# Patient Record
Sex: FEMALE | Race: WHITE | NOT HISPANIC OR LATINO | URBAN - METROPOLITAN AREA
[De-identification: names, ages, dates, MRNs, and addresses within clinical notes are randomized per-mention and may not be internally consistent; named-entity substitution may affect disease eponyms.]

---

## 2022-04-13 ENCOUNTER — EMERGENCY (EMERGENCY)
Facility: HOSPITAL | Age: 47
LOS: 1 days | Discharge: HOME | End: 2022-04-13
Attending: STUDENT IN AN ORGANIZED HEALTH CARE EDUCATION/TRAINING PROGRAM | Admitting: STUDENT IN AN ORGANIZED HEALTH CARE EDUCATION/TRAINING PROGRAM
Payer: COMMERCIAL

## 2022-04-13 VITALS
HEART RATE: 78 BPM | TEMPERATURE: 97 F | SYSTOLIC BLOOD PRESSURE: 122 MMHG | RESPIRATION RATE: 18 BRPM | OXYGEN SATURATION: 100 % | DIASTOLIC BLOOD PRESSURE: 79 MMHG

## 2022-04-13 VITALS
WEIGHT: 169.98 LBS | HEART RATE: 85 BPM | OXYGEN SATURATION: 97 % | SYSTOLIC BLOOD PRESSURE: 139 MMHG | TEMPERATURE: 98 F | DIASTOLIC BLOOD PRESSURE: 100 MMHG | RESPIRATION RATE: 18 BRPM | HEIGHT: 64 IN

## 2022-04-13 DIAGNOSIS — D72.829 ELEVATED WHITE BLOOD CELL COUNT, UNSPECIFIED: ICD-10-CM

## 2022-04-13 DIAGNOSIS — E86.0 DEHYDRATION: ICD-10-CM

## 2022-04-13 DIAGNOSIS — Z20.822 CONTACT WITH AND (SUSPECTED) EXPOSURE TO COVID-19: ICD-10-CM

## 2022-04-13 DIAGNOSIS — Z88.1 ALLERGY STATUS TO OTHER ANTIBIOTIC AGENTS STATUS: ICD-10-CM

## 2022-04-13 DIAGNOSIS — R10.9 UNSPECIFIED ABDOMINAL PAIN: ICD-10-CM

## 2022-04-13 DIAGNOSIS — Z88.0 ALLERGY STATUS TO PENICILLIN: ICD-10-CM

## 2022-04-13 DIAGNOSIS — R53.1 WEAKNESS: ICD-10-CM

## 2022-04-13 DIAGNOSIS — R11.2 NAUSEA WITH VOMITING, UNSPECIFIED: ICD-10-CM

## 2022-04-13 LAB
ALBUMIN SERPL ELPH-MCNC: 4.3 G/DL — SIGNIFICANT CHANGE UP (ref 3.3–5)
ALBUMIN SERPL ELPH-MCNC: 5.7 G/DL — HIGH (ref 3.3–5)
ALP SERPL-CCNC: 47 U/L — SIGNIFICANT CHANGE UP (ref 40–120)
ALP SERPL-CCNC: 70 U/L — SIGNIFICANT CHANGE UP (ref 40–120)
ALT FLD-CCNC: 35 U/L — SIGNIFICANT CHANGE UP (ref 10–45)
ALT FLD-CCNC: 51 U/L — HIGH (ref 10–45)
ANION GAP SERPL CALC-SCNC: 16 MMOL/L — SIGNIFICANT CHANGE UP (ref 5–17)
ANION GAP SERPL CALC-SCNC: 27 MMOL/L — HIGH (ref 5–17)
APPEARANCE UR: CLEAR — SIGNIFICANT CHANGE UP
AST SERPL-CCNC: 23 U/L — SIGNIFICANT CHANGE UP (ref 10–40)
AST SERPL-CCNC: 34 U/L — SIGNIFICANT CHANGE UP (ref 10–40)
BACTERIA # UR AUTO: PRESENT /HPF
BASE EXCESS BLDV CALC-SCNC: -15.6 MMOL/L — LOW (ref -2–3)
BASOPHILS # BLD AUTO: 0.06 K/UL — SIGNIFICANT CHANGE UP (ref 0–0.2)
BASOPHILS NFR BLD AUTO: 0.3 % — SIGNIFICANT CHANGE UP (ref 0–2)
BILIRUB SERPL-MCNC: 0.4 MG/DL — SIGNIFICANT CHANGE UP (ref 0.2–1.2)
BILIRUB SERPL-MCNC: 0.5 MG/DL — SIGNIFICANT CHANGE UP (ref 0.2–1.2)
BILIRUB UR-MCNC: ABNORMAL
BUN SERPL-MCNC: 7 MG/DL — SIGNIFICANT CHANGE UP (ref 7–23)
BUN SERPL-MCNC: 9 MG/DL — SIGNIFICANT CHANGE UP (ref 7–23)
CA-I SERPL-SCNC: 1.29 MMOL/L — SIGNIFICANT CHANGE UP (ref 1.15–1.33)
CALCIUM SERPL-MCNC: 10.9 MG/DL — HIGH (ref 8.4–10.5)
CALCIUM SERPL-MCNC: 8.6 MG/DL — SIGNIFICANT CHANGE UP (ref 8.4–10.5)
CHLORIDE SERPL-SCNC: 100 MMOL/L — SIGNIFICANT CHANGE UP (ref 96–108)
CHLORIDE SERPL-SCNC: 108 MMOL/L — SIGNIFICANT CHANGE UP (ref 96–108)
CO2 BLDV-SCNC: 10.8 MMOL/L — LOW (ref 22–26)
CO2 SERPL-SCNC: 11 MMOL/L — LOW (ref 22–31)
CO2 SERPL-SCNC: 9 MMOL/L — CRITICAL LOW (ref 22–31)
COLOR SPEC: YELLOW — SIGNIFICANT CHANGE UP
CREAT SERPL-MCNC: 0.67 MG/DL — SIGNIFICANT CHANGE UP (ref 0.5–1.3)
CREAT SERPL-MCNC: 0.87 MG/DL — SIGNIFICANT CHANGE UP (ref 0.5–1.3)
DIFF PNL FLD: ABNORMAL
EGFR: 110 ML/MIN/1.73M2 — SIGNIFICANT CHANGE UP
EGFR: 84 ML/MIN/1.73M2 — SIGNIFICANT CHANGE UP
EOSINOPHIL # BLD AUTO: 0.01 K/UL — SIGNIFICANT CHANGE UP (ref 0–0.5)
EOSINOPHIL NFR BLD AUTO: 0 % — SIGNIFICANT CHANGE UP (ref 0–6)
EPI CELLS # UR: SIGNIFICANT CHANGE UP /HPF (ref 0–5)
GAS PNL BLDV: 131 MMOL/L — LOW (ref 136–145)
GAS PNL BLDV: SIGNIFICANT CHANGE UP
GLUCOSE SERPL-MCNC: 177 MG/DL — HIGH (ref 70–99)
GLUCOSE SERPL-MCNC: 381 MG/DL — HIGH (ref 70–99)
GLUCOSE UR QL: NEGATIVE — SIGNIFICANT CHANGE UP
HCO3 BLDV-SCNC: 10 MMOL/L — CRITICAL LOW (ref 22–29)
HCT VFR BLD CALC: 39 % — SIGNIFICANT CHANGE UP (ref 34.5–45)
HCT VFR BLD CALC: 48.7 % — HIGH (ref 34.5–45)
HGB BLD-MCNC: 13.4 G/DL — SIGNIFICANT CHANGE UP (ref 11.5–15.5)
HGB BLD-MCNC: 16.7 G/DL — HIGH (ref 11.5–15.5)
IMM GRANULOCYTES NFR BLD AUTO: 0.5 % — SIGNIFICANT CHANGE UP (ref 0–1.5)
KETONES UR-MCNC: >=80 MG/DL
LACTATE SERPL-SCNC: 1.9 MMOL/L — SIGNIFICANT CHANGE UP (ref 0.5–2)
LEUKOCYTE ESTERASE UR-ACNC: NEGATIVE — SIGNIFICANT CHANGE UP
LIDOCAIN IGE QN: 27 U/L — SIGNIFICANT CHANGE UP (ref 7–60)
LYMPHOCYTES # BLD AUTO: 1.14 K/UL — SIGNIFICANT CHANGE UP (ref 1–3.3)
LYMPHOCYTES # BLD AUTO: 5.2 % — LOW (ref 13–44)
MCHC RBC-ENTMCNC: 30.8 PG — SIGNIFICANT CHANGE UP (ref 27–34)
MCHC RBC-ENTMCNC: 31.5 PG — SIGNIFICANT CHANGE UP (ref 27–34)
MCHC RBC-ENTMCNC: 34.3 GM/DL — SIGNIFICANT CHANGE UP (ref 32–36)
MCHC RBC-ENTMCNC: 34.4 GM/DL — SIGNIFICANT CHANGE UP (ref 32–36)
MCV RBC AUTO: 89.9 FL — SIGNIFICANT CHANGE UP (ref 80–100)
MCV RBC AUTO: 91.5 FL — SIGNIFICANT CHANGE UP (ref 80–100)
MONOCYTES # BLD AUTO: 0.91 K/UL — HIGH (ref 0–0.9)
MONOCYTES NFR BLD AUTO: 4.2 % — SIGNIFICANT CHANGE UP (ref 2–14)
NEUTROPHILS # BLD AUTO: 19.59 K/UL — HIGH (ref 1.8–7.4)
NEUTROPHILS NFR BLD AUTO: 89.8 % — HIGH (ref 43–77)
NITRITE UR-MCNC: NEGATIVE — SIGNIFICANT CHANGE UP
NRBC # BLD: 0 /100 WBCS — SIGNIFICANT CHANGE UP (ref 0–0)
NRBC # BLD: 0 /100 WBCS — SIGNIFICANT CHANGE UP (ref 0–0)
PCO2 BLDV: 24 MMHG — LOW (ref 39–42)
PH BLDV: 7.23 — LOW (ref 7.32–7.43)
PH UR: 6 — SIGNIFICANT CHANGE UP (ref 5–8)
PLATELET # BLD AUTO: 261 K/UL — SIGNIFICANT CHANGE UP (ref 150–400)
PLATELET # BLD AUTO: 318 K/UL — SIGNIFICANT CHANGE UP (ref 150–400)
PO2 BLDV: 61 MMHG — HIGH (ref 25–45)
POTASSIUM BLDV-SCNC: 3.7 MMOL/L — SIGNIFICANT CHANGE UP (ref 3.5–5.1)
POTASSIUM SERPL-MCNC: 3.7 MMOL/L — SIGNIFICANT CHANGE UP (ref 3.5–5.3)
POTASSIUM SERPL-MCNC: 4 MMOL/L — SIGNIFICANT CHANGE UP (ref 3.5–5.3)
POTASSIUM SERPL-SCNC: 3.7 MMOL/L — SIGNIFICANT CHANGE UP (ref 3.5–5.3)
POTASSIUM SERPL-SCNC: 4 MMOL/L — SIGNIFICANT CHANGE UP (ref 3.5–5.3)
PROT SERPL-MCNC: 6.8 G/DL — SIGNIFICANT CHANGE UP (ref 6–8.3)
PROT SERPL-MCNC: 9.3 G/DL — HIGH (ref 6–8.3)
PROT UR-MCNC: 100 MG/DL
RBC # BLD: 4.26 M/UL — SIGNIFICANT CHANGE UP (ref 3.8–5.2)
RBC # BLD: 5.42 M/UL — HIGH (ref 3.8–5.2)
RBC # FLD: 12.8 % — SIGNIFICANT CHANGE UP (ref 10.3–14.5)
RBC # FLD: 12.9 % — SIGNIFICANT CHANGE UP (ref 10.3–14.5)
RBC CASTS # UR COMP ASSIST: < 5 /HPF — SIGNIFICANT CHANGE UP
SAO2 % BLDV: 92.1 % — HIGH (ref 67–88)
SARS-COV-2 RNA SPEC QL NAA+PROBE: NEGATIVE — SIGNIFICANT CHANGE UP
SODIUM SERPL-SCNC: 135 MMOL/L — SIGNIFICANT CHANGE UP (ref 135–145)
SODIUM SERPL-SCNC: 136 MMOL/L — SIGNIFICANT CHANGE UP (ref 135–145)
SP GR SPEC: >=1.03 — SIGNIFICANT CHANGE UP (ref 1–1.03)
TROPONIN T SERPL-MCNC: <0.01 NG/ML — SIGNIFICANT CHANGE UP (ref 0–0.01)
UROBILINOGEN FLD QL: 0.2 E.U./DL — SIGNIFICANT CHANGE UP
WBC # BLD: 17.99 K/UL — HIGH (ref 3.8–10.5)
WBC # BLD: 21.83 K/UL — HIGH (ref 3.8–10.5)
WBC # FLD AUTO: 17.99 K/UL — HIGH (ref 3.8–10.5)
WBC # FLD AUTO: 21.83 K/UL — HIGH (ref 3.8–10.5)
WBC UR QL: < 5 /HPF — SIGNIFICANT CHANGE UP

## 2022-04-13 PROCEDURE — 80053 COMPREHEN METABOLIC PANEL: CPT

## 2022-04-13 PROCEDURE — 96374 THER/PROPH/DIAG INJ IV PUSH: CPT

## 2022-04-13 PROCEDURE — 96375 TX/PRO/DX INJ NEW DRUG ADDON: CPT

## 2022-04-13 PROCEDURE — 83690 ASSAY OF LIPASE: CPT

## 2022-04-13 PROCEDURE — 82803 BLOOD GASES ANY COMBINATION: CPT

## 2022-04-13 PROCEDURE — 99284 EMERGENCY DEPT VISIT MOD MDM: CPT | Mod: 25

## 2022-04-13 PROCEDURE — 83605 ASSAY OF LACTIC ACID: CPT

## 2022-04-13 PROCEDURE — 36415 COLL VENOUS BLD VENIPUNCTURE: CPT

## 2022-04-13 PROCEDURE — 87635 SARS-COV-2 COVID-19 AMP PRB: CPT

## 2022-04-13 PROCEDURE — 99284 EMERGENCY DEPT VISIT MOD MDM: CPT

## 2022-04-13 PROCEDURE — 85025 COMPLETE CBC W/AUTO DIFF WBC: CPT

## 2022-04-13 PROCEDURE — 84132 ASSAY OF SERUM POTASSIUM: CPT

## 2022-04-13 PROCEDURE — 84484 ASSAY OF TROPONIN QUANT: CPT

## 2022-04-13 PROCEDURE — 84295 ASSAY OF SERUM SODIUM: CPT

## 2022-04-13 PROCEDURE — 85027 COMPLETE CBC AUTOMATED: CPT

## 2022-04-13 PROCEDURE — 82330 ASSAY OF CALCIUM: CPT

## 2022-04-13 PROCEDURE — 81001 URINALYSIS AUTO W/SCOPE: CPT

## 2022-04-13 RX ORDER — SODIUM CHLORIDE 9 MG/ML
1000 INJECTION INTRAMUSCULAR; INTRAVENOUS; SUBCUTANEOUS ONCE
Refills: 0 | Status: COMPLETED | OUTPATIENT
Start: 2022-04-13 | End: 2022-04-13

## 2022-04-13 RX ORDER — ONDANSETRON 8 MG/1
4 TABLET, FILM COATED ORAL ONCE
Refills: 0 | Status: COMPLETED | OUTPATIENT
Start: 2022-04-13 | End: 2022-04-13

## 2022-04-13 RX ORDER — METOCLOPRAMIDE HCL 10 MG
10 TABLET ORAL ONCE
Refills: 0 | Status: COMPLETED | OUTPATIENT
Start: 2022-04-13 | End: 2022-04-13

## 2022-04-13 RX ORDER — SODIUM CHLORIDE 9 MG/ML
1000 INJECTION, SOLUTION INTRAVENOUS
Refills: 0 | Status: DISCONTINUED | OUTPATIENT
Start: 2022-04-13 | End: 2022-04-17

## 2022-04-13 RX ORDER — FAMOTIDINE 10 MG/ML
20 INJECTION INTRAVENOUS ONCE
Refills: 0 | Status: COMPLETED | OUTPATIENT
Start: 2022-04-13 | End: 2022-04-13

## 2022-04-13 RX ADMIN — ONDANSETRON 4 MILLIGRAM(S): 8 TABLET, FILM COATED ORAL at 16:51

## 2022-04-13 RX ADMIN — Medication 104 MILLIGRAM(S): at 15:29

## 2022-04-13 RX ADMIN — SODIUM CHLORIDE 1000 MILLILITER(S): 9 INJECTION, SOLUTION INTRAVENOUS at 16:56

## 2022-04-13 RX ADMIN — SODIUM CHLORIDE 1000 MILLILITER(S): 9 INJECTION INTRAMUSCULAR; INTRAVENOUS; SUBCUTANEOUS at 15:26

## 2022-04-13 RX ADMIN — FAMOTIDINE 20 MILLIGRAM(S): 10 INJECTION INTRAVENOUS at 15:27

## 2022-04-13 NOTE — ED ADULT NURSE NOTE - OBJECTIVE STATEMENT
Pt A&Ox4, ambulatory with steady gait, speaking in clear/full sentences, no acute distress, vital signs stable. Pt presents to the ED for nausea x 2 days. Pt reports she is doing a 10 day water fast and has started vomiting the last two days. Pt reports doing water fasts in the past. Pt reports burning sensation in the epigastric area. No radiation or pain otherwise. No pain on palpation. Pt reports normal bowel movents. LBM today. PT denies coffee ground emesis, tarry stool, or blood in stool. Pt denies fevers, chills, burning urination, frequency,

## 2022-04-13 NOTE — ED PROVIDER NOTE - NSFOLLOWUPINSTRUCTIONS_ED_ALL_ED_FT
Dehydration, Adult  Dehydration is condition in which there is not enough water or other fluids in the body. This happens when a person loses more fluids than he or she takes in. Important body parts cannot work right without the right amount of fluids. Any loss of fluids from the body can cause dehydration.  Dehydration can be mild, worse, or very bad. It should be treated right away to keep it from getting very bad.  What are the causes?  This condition may be caused by:•Conditions that cause loss of water or other fluids, such as:   •Watery poop (diarrhea).  •Vomiting.  •Sweating a lot.  •Peeing (urinating) a lot.  •Not drinking enough fluids, especially when you:   •Are ill.  •Are doing things that take a lot of energy to do.  •Other illnesses and conditions, such as fever or infection.  •Certain medicines, such as medicines that take extra fluid out of the body (diuretics).  •Lack of safe drinking water.  Not being able to get enough water and food.  What increases the risk?  The following factors may make you more likely to develop this condition:•Having a long-term (chronic) illness that has not been treated the right way, such as:  •Diabetes.  •Heart disease.  •Kidney disease.  •Being 65 years of age or older.  •Having a disability.  •Living in a place that is high above the ground or sea (high in altitude). The thinner, dried air causes more fluid loss.  •Doing exercises that put stress on your body for a long time.  What are the signs or symptoms?  Symptoms of dehydration depend on how bad it is.  Mild or worse dehydration   •Thirst.  •Dry lips or dry mouth.  •Feeling dizzy or light-headed, especially when you stand up from sitting.  •Muscle cramps.  •Your body making:  •Dark pee (urine). Pee may be the color of tea.  •Less pee than normal.  •Less tears than normal.  •Headache  Very bad dehydration   •Changes in skin. Skin may:  •Be cold to the touch (clammy).  •Be blotchy or pale.  •Not go back to normal right after you lightly pinch it and let it go.  •Little or no tears, pee, or sweat.  changes in vital signs, such as:  •Fast breathing.  •Low blood pressure.  •Weak pulse.  •Pulse that is more than 100 beats a minute when you are sitting still.  •Other changes, such as:  •Feeling very thirsty.  •Eyes that look hollow (sunken).  •Cold hands and feet.  •Being mixed up (confused).  •Being very tired (lethargic) or having trouble waking from sleep.  •Short-term weight loss.  •Loss of consciousness.  How is this treated?  Treatment for this condition depends on how bad it is. Treatment should start right away. Do not wait until your condition gets very bad. Very bad dehydration is an emergency. You will need to go to a hospital.•Mild or worse dehydration can be treated at home. You may be asked to:  •Drink more fluids.  •Drink an oral rehydration solution (ORS). This drink helps get the right amounts of fluids and salts and minerals in the blood (electrolytes).  •Very bad dehydration can be treated:  •With fluids through an IV tube.  •By getting normal levels of salts and minerals in your blood. This is often done by giving salts and minerals through a tube. The tube is passed through your nose and into your stomach.  •By treating the root cause.  Follow these instructions at home:  Oral rehydration solution   If told by your doctor, drink an ORS:  •Make an ORS. Use instructions on the package.  •Start by drinking small amounts, about ½ cup (120 mL) every 5–10 minutes.  •Slowly drink more until you have had the amount that your doctor said to have.  Eating and drinking  •Drink enough clear fluid to keep your pee pale yellow. If you were told to drink an ORS, finish the ORS first. Then, start slowly drinking other clear fluids. Drink fluids such as:  •Water. Do not drink only water. Doing that can make the salt (sodium) level in your body get too low.  •Water from ice chips you suck on.  •Fruit juice that you have added water to (diluted).  •Low-calorie sports drinks.  •Eat foods that have the right amounts of salts and minerals, such as:  •Bananas.  •Oranges.  •Potatoes.  •Tomatoes.  •Spinach.  •Do not drink alcohol.  •Avoid:•Drinks that have a lot of sugar. These include:   •High-calorie sports drinks.  •Fruit juice that you did not add water to.  •Soda.  •Caffeine.  •Foods that are greasy or have a lot of fat or sugar.  General instructions  •Take over-the-counter and prescription medicines only as told by your doctor.  •Do not take salt tablets. Doing that can make the salt level in your body get too high.  •Return to your normal activities as told by your doctor. Ask your doctor what activities are safe for you.  •Keep all follow-up visits as told by your doctor. This is important.  Contact a doctor if:  •You have pain in your belly (abdomen) and the pain:  •Gets worse.  •Stays in one place.  •You have a rash.  •You have a stiff neck.  •You get angry or annoyed (irritable) more easily than normal.  •You are more tired or have a harder time waking than normal.  •You feel:  •Weak or dizzy.  •Very thirsty.  Get help right away if you have:  •Any symptoms of very bad dehydration.  •Symptoms of vomiting, such as:  •You cannot eat or drink without vomiting.  •Your vomiting gets worse or does not go away.  •Your vomit has blood or green stuff in it.  •Symptoms that get worse with treatment.  •A fever.  •A very bad headache.  •Problems with peeing or pooping (having a bowel movement), such as:  •Watery poop that gets worse or does not go away.  •Blood in your poop (stool). This may cause poop to look black and tarry.  •Not peeing in 6–8 hours.  •Peeing only a small amount of very dark pee in 6–8 hours.  •Trouble breathing.  These symptoms may be an emergency. Do not wait to see if the symptoms will go away. Get medical help right away. Call your local emergency services (911 in the [place] Do not drive yourself to the hospital.   Summary  •Dehydration is a condition in which there is not enough water or other fluids in the body. This happens when a person loses more fluids than he or she takes in.  •Treatment for this condition depends on how bad it is. Treatment should be started right away. Do not wait until your condition gets very bad.  •Drink enough clear fluid to keep your pee pale yellow. If you were told to drink an oral rehydration solution (ORS), finish the ORS first. Then, start slowly drinking other clear fluids.  •Take over-the-counter and prescription medicines only as told by your doctor.   •Get help right away if you have any symptoms of very bad dehydration.

## 2022-04-13 NOTE — ED PROVIDER NOTE - OBJECTIVE STATEMENT
The pt is a 45 y/o F, who presents to ED c/o n/v x 2 d. Pt states that has been doing "water fasting" for past 10 d, and is now feeling very "weak and dehydrated", has not been able to keep any water down over past 2d, now having loose BMs as well, some abd cramping w/emesis and "a lot of acid". No PSH.  Denies fevers, chills, cp, sob, dysuria, anorexia, flank pain, dizziness, syncope.

## 2022-04-13 NOTE — ED PROVIDER NOTE - NS ED ATTENDING STATEMENT MOD
This was a shared visit with the GREY. I reviewed and verified the documentation and independently performed the documented:

## 2022-04-13 NOTE — ED PROVIDER NOTE - CLINICAL SUMMARY MEDICAL DECISION MAKING FREE TEXT BOX
pt c/o n/v x 2d, unable to keep anything po down, now also w/loose BMs, has been doing water fasting for past 10d, and feeling dehydrated and weak, benign abd, hemodynamically stable, labs   , given ivf and antiemetics plus h2 blocker w/    relief pt c/o n/v x 2d, unable to keep anything po down, now also w/loose BMs, has been doing water fasting for past 10d, and feeling dehydrated and weak, benign abd, hemodynamically stable, labs grossly abnormal upon arrival - hemoconcentrated w/leukocytosis, low bicarb and anion gap -- all improved and gap closed after ivf, pt w/large ketones hence was given d5ns, given ivf and antiemetics plus h2 blocker w/good relief - pt tolerating po challenges w/o dif, has remained hemodynamically stable, well appearing, no infectious process identified, symptom control and supportive care discussed, importance of proper hydration and well balanced diet discussed, pt understands and agrees w/plan, strict return precautions given

## 2022-04-13 NOTE — ED PROVIDER NOTE - PATIENT PORTAL LINK FT
You can access the FollowMyHealth Patient Portal offered by [facility] by registering at the following website: [email or web address]. By joining [employer]’s FollowMyHealth portal, you will also be able to view your health information using other applications (apps) compatible with our system.

## 2022-04-13 NOTE — ED ADULT NURSE NOTE - CHIEF COMPLAINT QUOTE
Pt presents to the ED c/o vomiting associated w/ diarrhea x 2 days. Pt states, "I was water fasting and I am on day 10 and I cannot stop vomiting." Denies fever, chills, CP, SOB, urinary sx.

## 2022-04-13 NOTE — ED PROVIDER NOTE - ATTENDING CONTRIBUTION TO CARE
HAGMA on initial labs likely due to ketonemia from starvation ketosis 2/2 no PO intake x10 days. No suspicion for surgical emergency. Repeat labs showed anion gap closing but bicarb unchanged- iatrogenic from hyperchloremic non gap acidosis due to IVF. Pt tolerating PO in the ED. Shared decision making employed with the patient. Prefers to go home, eat and follow up with PMD. Counseled extensively on the danger of water fasting and recommended nutritionist/PMD f/u.

## 2022-04-14 RX ORDER — FAMOTIDINE 10 MG/ML
1 INJECTION INTRAVENOUS
Qty: 14 | Refills: 0
Start: 2022-04-14 | End: 2022-04-20

## 2022-04-14 RX ORDER — ONDANSETRON 8 MG/1
1 TABLET, FILM COATED ORAL
Qty: 9 | Refills: 0
Start: 2022-04-14 | End: 2022-04-16
